# Patient Record
Sex: FEMALE | Race: BLACK OR AFRICAN AMERICAN | URBAN - METROPOLITAN AREA
[De-identification: names, ages, dates, MRNs, and addresses within clinical notes are randomized per-mention and may not be internally consistent; named-entity substitution may affect disease eponyms.]

---
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
## 2019-02-12 DIAGNOSIS — Z30.41 ENCOUNTER FOR SURVEILLANCE OF CONTRACEPTIVE PILLS: ICD-10-CM

## 2019-02-12 RX ORDER — NORETHINDRONE ACETATE AND ETHINYL ESTRADIOL 1; .02 MG/1; MG/1
1 TABLET ORAL DAILY
Qty: 3 PACKAGE | Refills: 3 | Status: CANCELLED | OUTPATIENT
Start: 2019-02-12

## 2019-02-12 NOTE — TELEPHONE ENCOUNTER
[facility] [phone]      (A) [phone]        *Above pharmacy is not the pharmacy on file but it's where the request came from for [address] [place] [address]*

## 2019-02-13 RX ORDER — NORETHINDRONE ACETATE AND ETHINYL ESTRADIOL 1; .02 MG/1; MG/1
1 TABLET ORAL DAILY
Qty: 3 PACKAGE | Refills: 3 | Status: SHIPPED | OUTPATIENT
Start: 2019-02-13 | End: 2019-02-14 | Stop reason: SDUPTHER

## 2019-02-14 DIAGNOSIS — Z30.41 ENCOUNTER FOR SURVEILLANCE OF CONTRACEPTIVE PILLS: Primary | ICD-10-CM

## 2019-02-14 RX ORDER — NORETHINDRONE ACETATE AND ETHINYL ESTRADIOL 1; .02 MG/1; MG/1
1 TABLET ORAL DAILY
Qty: 3 PACKAGE | Refills: 3 | Status: SHIPPED | OUTPATIENT
Start: 2019-02-14

## 2019-02-19 ENCOUNTER — OFFICE VISIT (OUTPATIENT)
Dept: INTERNAL MEDICINE CLINIC | Facility: CLINIC | Age: 19
End: 2019-02-19

## 2019-02-19 VITALS
HEIGHT: 67 IN | BODY MASS INDEX: 24.64 KG/M2 | TEMPERATURE: 98.4 F | SYSTOLIC BLOOD PRESSURE: 104 MMHG | HEART RATE: 81 BPM | WEIGHT: 157 LBS | DIASTOLIC BLOOD PRESSURE: 65 MMHG | RESPIRATION RATE: 16 BRPM

## 2019-02-19 DIAGNOSIS — Z11.3 SCREEN FOR STD (SEXUALLY TRANSMITTED DISEASE): Primary | ICD-10-CM

## 2019-02-19 DIAGNOSIS — B96.89 BV (BACTERIAL VAGINOSIS): ICD-10-CM

## 2019-02-19 DIAGNOSIS — N76.0 BV (BACTERIAL VAGINOSIS): ICD-10-CM

## 2019-02-19 RX ORDER — METRONIDAZOLE 500 MG/1
500 TABLET ORAL 2 TIMES DAILY
Qty: 14 TAB | Refills: 0 | Status: SHIPPED | OUTPATIENT
Start: 2019-02-19 | End: 2019-02-19 | Stop reason: SDUPTHER

## 2019-02-19 RX ORDER — METRONIDAZOLE 500 MG/1
500 TABLET ORAL 2 TIMES DAILY
Qty: 14 TAB | Refills: 0 | Status: SHIPPED | OUTPATIENT
Start: 2019-02-19 | End: 2022-09-20 | Stop reason: ALTCHOICE

## 2019-02-19 NOTE — PROGRESS NOTES
Subjective:      Lesly Lobato is a 22 y.o. female who presents today for STD screening. STD screening: she requests screening, she states she has had multiple new partners. She has symptoms of fishy odor and discharge. She states it is the same symptoms as when she had BV, She denies fevers, abdominal pain. There are no active problems to display for this patient. Current Outpatient Medications   Medication Sig Dispense Refill    norethindrone-ethinyl estradiol (JUNEL 1/20, 21,) 1-20 mg-mcg tab Take 1 Tab by mouth daily. 3 Package 3    metroNIDAZOLE (FLAGYL) 500 mg tablet Take 1 Tab by mouth two (2) times a day. 14 Tab 0     No Known Allergies  History reviewed. No pertinent past medical history. Past Surgical History:   Procedure Laterality Date    HX CYSTECTOMY      Cyst removed from right wrist    HX WISDOM TEETH EXTRACTION          Review of Systems    A comprehensive review of systems was negative except for that written in the HPI.      Objective:     Visit Vitals  /65   Pulse 81   Temp 98.4 °F (36.9 °C) (Oral)   Resp 16   Ht 5' 7\" (1.702 m)   Wt 157 lb (71.2 kg)   LMP 01/02/2019 (Approximate) Comment: Skipped placebo this month   BMI 24.59 kg/m²     General appearance: alert, cooperative, no distress, appears stated age  Head: Normocephalic, without obvious abnormality, atraumatic  Eyes: negative  Lungs: clear to auscultation bilaterally  Heart: regular rate and rhythm, S1, S2 normal, no murmur, click, rub or gallop  Extremities: extremities normal, atraumatic, no cyanosis or edema  Neurologic: Grossly normal  Nursing note and vitals reviewed  Assessment/Plan:       ICD-10-CM ICD-9-CM    1. Screen for STD (sexually transmitted disease) Z11.3 V74.5 CT/NG/T.VAGINALIS AMPLIFICATION      HIV 1/2 AG/AB, 4TH GENERATION,W RFLX CONFIRM      RPR W/REFLEX TITER AND TREPONEMA ABS   2. BV (bacterial vaginosis) N76.0 616.10 metroNIDAZOLE (FLAGYL) 500 mg tablet    B96.89 041.9 DISCONTINUED: metroNIDAZOLE (FLAGYL) 500 mg tablet       Follow-up Disposition:  Return for As needed. Advised her to call back or return to office if symptoms worsen/change/persist.  Discussed expected course/resolution/complications of diagnosis in detail with patient. Medication risks/benefits/costs/interactions/alternatives discussed with patient. She was given an after visit summary which includes diagnoses, current medications, & vitals. She expressed understanding with the diagnosis and plan.

## 2019-02-19 NOTE — PROGRESS NOTES
Chief Complaint   Patient presents with    Other     would like STD screening. 1. Have you been to the ER, urgent care clinic since your last visit? Hospitalized since your last visit? No    2. Have you seen or consulted any other health care providers outside of the [address] [place] [address] since your last visit? Include any pap smears or colon screening.  No

## 2019-02-21 LAB
C TRACH RRNA SPEC QL NAA+PROBE: NEGATIVE
N GONORRHOEA RRNA SPEC QL NAA+PROBE: NEGATIVE
T VAGINALIS RRNA VAG QL NAA+PROBE: NEGATIVE

## 2022-09-20 ENCOUNTER — OFFICE VISIT (OUTPATIENT)
Dept: PRIMARY CARE CLINIC | Age: 22
End: 2022-09-20
Payer: COMMERCIAL

## 2022-09-20 VITALS
BODY MASS INDEX: 30.04 KG/M2 | WEIGHT: 198.2 LBS | HEART RATE: 86 BPM | SYSTOLIC BLOOD PRESSURE: 124 MMHG | RESPIRATION RATE: 16 BRPM | OXYGEN SATURATION: 98 % | HEIGHT: 68 IN | DIASTOLIC BLOOD PRESSURE: 80 MMHG

## 2022-09-20 DIAGNOSIS — F41.8 DEPRESSION WITH ANXIETY: ICD-10-CM

## 2022-09-20 DIAGNOSIS — Z76.89 ENCOUNTER TO ESTABLISH CARE: Primary | ICD-10-CM

## 2022-09-20 DIAGNOSIS — M67.432 GANGLION CYST OF WRIST, LEFT: ICD-10-CM

## 2022-09-20 PROCEDURE — 20612 ASPIRATE/INJ GANGLION CYST: CPT | Performed by: FAMILY MEDICINE

## 2022-09-20 PROCEDURE — 99203 OFFICE O/P NEW LOW 30 MIN: CPT | Performed by: FAMILY MEDICINE

## 2022-09-20 NOTE — PROGRESS NOTES
Chief Complaint   Patient presents with    New Patient     Routine visit, knot to left hand      Visit Vitals  /80 (BP 1 Location: Left upper arm, BP Patient Position: Sitting, BP Cuff Size: Adult)   Pulse 86   Resp 16   Ht 5' 8\" (1.727 m)   Wt 198 lb 3.2 oz (89.9 kg)   LMP 09/05/2022   SpO2 98%   BMI 30.14 kg/m²     1. \"Have you been to the ER, urgent care clinic since your last visit? Hospitalized since your last visit? \" No    2. \"Have you seen or consulted any other health care providers outside of the [address] [place] [address] since your last visit? \" No     3. For patients aged 39-70: Has the patient had a colonoscopy / FIT/ Cologuard? NA - based on age      If the patient is female:    4. For patients aged 41-77: Has the patient had a mammogram within the past 2 years? NA - based on age or sex      11. For patients aged 21-65: Has the patient had a pap smear?  NA - based on age or sex

## 2022-09-20 NOTE — PROGRESS NOTES
Ame Nurse (: 2000) is a 20 y.o. female, new patient, here for evaluation of the following chief complaint(s):  New Patient (Routine visit, knot to left hand )       ASSESSMENT/PLAN:  Below is the assessment and plan developed based on review of pertinent history, physical exam, labs, studies, and medications. 1. Encounter to establish care  Informed consent obtained. Ganglion cyst was aspirated with no complications. Patient to schedule with former counselor/therapist to discuss depression and anxiety. 2. Ganglion cyst of wrist, left  Informed consent obtained prior to procedure. Ganglion was entered using an 18-gauge 1-1/2 inch needle on a 5 mL syringe at an angle that provided optimal access to the cyst and comfort for the patient. Cyst was aspirated with clear gel like material.  There were no bleeding complications. Patient tolerated procedure well and return precautions given. 3. Depression with anxiety  Chronic      Return if symptoms worsen or fail to improve. SUBJECTIVE/OBJECTIVE:  HPI    79-year-old female on oral contraception comes to the office today to establish care and for small mass on left hand. Patient states she noted a small swelling on the left wrist.  Patient states while it is not really painful most of the time, only has felt sharp shooting pains up the arm when her wrist is extended. Patient states she has been very sad since losing a close contact (boyfriend's uncle). She denies suicidal or homicidal ideation. She is not prescribed antidepressants. Patient sees [facility] for her gynecologic care. No Known Allergies  Current Outpatient Medications   Medication Sig    norethindrone-ethinyl estradiol (JUNEL 1/20, 21,) 1-20 mg-mcg tab Take 1 Tab by mouth daily. No current facility-administered medications for this visit. History reviewed. No pertinent past medical history.   Past Surgical History:   Procedure Laterality Date    HX CYSTECTOMY      Cyst removed from right wrist    HX WISDOM TEETH EXTRACTION       History reviewed. No pertinent family history. Social History     Tobacco Use   Smoking Status Some Days   Smokeless Tobacco Never         Review of Systems   All other systems reviewed and are negative. /80 (BP 1 Location: Left upper arm, BP Patient Position: Sitting, BP Cuff Size: Adult)   Pulse 86   Resp 16   Ht 5' 8\" (1.727 m)   Wt 198 lb 3.2 oz (89.9 kg)   LMP 09/05/2022   SpO2 98%   BMI 30.14 kg/m²    Physical Exam  Vitals reviewed. Cardiovascular:      Rate and Rhythm: Normal rate. Pulses: Normal pulses. Pulmonary:      Breath sounds: Normal breath sounds. Musculoskeletal:      Cervical back: Neck supple. Comments: Ganglion cyst on left dorsal wrist.   Skin:     General: Skin is warm. Capillary Refill: Capillary refill takes less than 2 seconds. Neurological:      General: No focal deficit present. Mental Status: She is alert. Sensory: Sensation is intact. Motor: Motor function is intact. Coordination: Coordination is intact. Gait: Gait is intact. Psychiatric:         Mood and Affect: Affect is tearful. Speech: Speech normal.         Behavior: Behavior normal.         Thought Content: Thought content normal.         Cognition and Memory: Cognition normal.           On this date 09/20/2022 I have spent 30 minutes reviewing previous notes, test results and face to face with the patient discussing the diagnosis and importance of compliance with the treatment plan as well as documenting on the day of the visit. An electronic signature was used to authenticate this note.   -- Hi Stoll MD   [facility] [id number]

## 2023-02-16 ENCOUNTER — VIRTUAL VISIT (OUTPATIENT)
Dept: PRIMARY CARE CLINIC | Age: 23
End: 2023-02-16

## 2023-02-16 DIAGNOSIS — F41.8 DEPRESSION WITH ANXIETY: Primary | ICD-10-CM

## 2023-05-22 RX ORDER — NORETHINDRONE ACETATE AND ETHINYL ESTRADIOL 1; .02 MG/1; MG/1
1 TABLET ORAL DAILY
COMMUNITY
Start: 2019-02-14